# Patient Record
(demographics unavailable — no encounter records)

---

## 2025-07-30 NOTE — HISTORY OF PRESENT ILLNESS
[FreeTextEntry1] : Erick Abreu presented to the office today for follow-up evaluation.  He was last seen in the office July.  He is now 60 years old, with a history of essential hypertension and reflux. He does not have any known structural heart disease. He presented to the hospital having been referred from his internal medicine office because of essential hypertension which was poorly controlled. Over time his medications have been adjusted.  Echocardiography from August 2020 revealed an ejection fraction of 65%, with age-appropriate valvular calcification, minimal aortic and mitral regurgitation.  Estimated PA systolic pressure was borderline at 35 mmHg.   He has a lot of orthopedic issues, and he has been told he needs a right knee replacement. He was treated with meloxicam for knee pain and inflammation, and developed swelling of the legs.  He was evaluated for here in the office, was felt to have mostly venous insufficiency, for which he was advised to wear compression socks.   He was reevaluated because of edema and his blood pressure in July, 2024.  On furosemide, and with decreased fluid intake, there was improvement in the degree of edema.  On carvedilol, there was improvement in his blood pressure as well.  Previously,  He does not report symptoms of chest discomfort or shortness of breath with activity.  He denies orthopnea, PND.  He continues to experience a tendency toward significant venous congestion, despite the use of compression socks, and relatively frequent use of furosemide.  He denies palpitations, dizziness and syncope.  He reports that his blood pressure has been reasonably controlled, until recently. He went for medical clearance for a vein reflux procedure, and his BP was high, and there was concern that he might need to cancel it.  He had not been checking his blood pressure very frequently, and at this point is concerned that his blood pressure might be too elevated to get the procedure done.  He has been struggling from orthopedic issues, great knee pains and has now agreed to proceed with TKR, RT on 9/2/25 with Dr Kim at [facility]. He has been sedentary due to his knee issues. He is also very concerned about his persistent issues with LE edema/venous insufficiency. He was advised by vascular and was advised to have special compression boots. He has not followed. He does take lasix 40mg as needed daily for a week at a time.

## 2025-07-30 NOTE — CARDIOLOGY SUMMARY
[de-identified] : sinus rhythm  [de-identified] : 2019 exercise stress  10 METS [de-identified] : 5/2024 LVEF 61%, trace MR [de-identified] : 5/2024 LE doppler negative for DVT

## 2025-07-30 NOTE — CARDIOLOGY SUMMARY
[de-identified] : sinus rhythm  [de-identified] : 2019 exercise stress  10 METS [de-identified] : 5/2024 LVEF 61%, trace MR [de-identified] : 5/2024 LE doppler negative for DVT

## 2025-07-30 NOTE — CARDIOLOGY SUMMARY
[de-identified] : sinus rhythm  [de-identified] : 2019 exercise stress  10 METS [de-identified] : 5/2024 LVEF 61%, trace MR [de-identified] : 5/2024 LE doppler negative for DVT

## 2025-07-30 NOTE — HISTORY OF PRESENT ILLNESS
[FreeTextEntry1] : Erick Abreu presented to the office today for follow-up evaluation.  He was last seen in the office July.  He is now 60 years old, with a history of essential hypertension and reflux. He does not have any known structural heart disease. He presented to the hospital having been referred from his internal medicine office because of essential hypertension which was poorly controlled. Over time his medications have been adjusted.  Echocardiography from August 2020 revealed an ejection fraction of 65%, with age-appropriate valvular calcification, minimal aortic and mitral regurgitation.  Estimated PA systolic pressure was borderline at 35 mmHg.   He has a lot of orthopedic issues, and he has been told he needs a right knee replacement. He was treated with meloxicam for knee pain and inflammation, and developed swelling of the legs.  He was evaluated for here in the office, was felt to have mostly venous insufficiency, for which he was advised to wear compression socks.   He was reevaluated because of edema and his blood pressure in July, 2024.  On furosemide, and with decreased fluid intake, there was improvement in the degree of edema.  On carvedilol, there was improvement in his blood pressure as well.  Previously,  He does not report symptoms of chest discomfort or shortness of breath with activity.  He denies orthopnea, PND.  He continues to experience a tendency toward significant venous congestion, despite the use of compression socks, and relatively frequent use of furosemide.  He denies palpitations, dizziness and syncope.  He reports that his blood pressure has been reasonably controlled, until recently. He went for medical clearance for a vein reflux procedure, and his BP was high, and there was concern that he might need to cancel it.  He had not been checking his blood pressure very frequently, and at this point is concerned that his blood pressure might be too elevated to get the procedure done.  He has been struggling from orthopedic issues, great knee pains and has now agreed to proceed with TKR, RT on 9/2/25 with Dr Kim at [facility]. He has been sedentary due to his knee issues. He is also very concerned about his persistent issues with LE edema/venous insufficiency. He was advised by vascular and was advised to have special compression boots. He has not followed. He does take lasix 40mg as needed daily for a week at a time.

## 2025-07-30 NOTE — REASON FOR VISIT
[Hyperlipidemia] : hyperlipidemia [Hypertension] : hypertension [Follow-Up - Clinic] : a clinic follow-up of [Other: ____] : [unfilled]

## 2025-07-30 NOTE — PHYSICAL EXAM
[General Appearance - Well Developed] : well developed [Normal Appearance] : normal appearance [Well Groomed] : well groomed [General Appearance - Well Nourished] : well nourished [No Deformities] : no deformities [General Appearance - In No Acute Distress] : no acute distress [Eyelids - No Xanthelasma] : the eyelids demonstrated no xanthelasmas [Normal Oral Mucosa] : normal oral mucosa [No Oral Pallor] : no oral pallor [No Oral Cyanosis] : no oral cyanosis [Normal Jugular Venous A Waves Present] : normal jugular venous A waves present [Normal Jugular Venous V Waves Present] : normal jugular venous V waves present [No Jugular Venous Go A Waves] : no jugular venous og A waves [Respiration, Rhythm And Depth] : normal respiratory rhythm and effort [Exaggerated Use Of Accessory Muscles For Inspiration] : no accessory muscle use [Auscultation Breath Sounds / Voice Sounds] : lungs were clear to auscultation bilaterally [Abdomen Soft] : soft [Abdomen Tenderness] : non-tender [Abdomen Mass (___ Cm)] : no abdominal mass palpated [Abnormal Walk] : normal gait [Gait - Sufficient For Exercise Testing] : the gait was sufficient for exercise testing [Nail Clubbing] : no clubbing of the fingernails [Cyanosis, Localized] : no localized cyanosis [Petechial Hemorrhages (___cm)] : no petechial hemorrhages [Skin Color & Pigmentation] : normal skin color and pigmentation [] : no rash [No Venous Stasis] : no venous stasis [Skin Lesions] : no skin lesions [No Skin Ulcers] : no skin ulcer [No Xanthoma] : no  xanthoma was observed [Oriented To Time, Place, And Person] : oriented to person, place, and time [Affect] : the affect was normal [Mood] : the mood was normal [No Anxiety] : not feeling anxious [Normal Rate] : normal [2+] : left 2+ [Well Developed] : well developed [Normal Conjunctiva] : normal conjunctiva [Normal Venous Pressure] : normal venous pressure [Rhythm Regular] : regular [Normal S1] : normal S1 [Normal S2] : normal S2 [No Murmur] : no murmurs heard [___ +] : bilateral [unfilled]U+ pretibial pitting edema [Rt] : varicose veins of the right leg noted [Lt] : varicose veins of the left leg noted [No Abnormalities] : the abdominal aorta was not enlarged and no bruit was heard [Clear Lung Fields] : clear lung fields [Soft] : abdomen soft [Non Tender] : non-tender [Normal Gait] : normal gait [No Rash] : no rash [Moves all extremities] : moves all extremities [No Focal Deficits] : no focal deficits [Alert and Oriented] : alert and oriented [S3] : no S3 [S4] : no S4 [Right Femoral Bruit] : no bruit heard over the right femoral artery [Left Femoral Bruit] : no bruit heard over the left femoral artery [Right Carotid Bruit] : no bruit heard over the right carotid [Left Carotid Bruit] : no bruit heard over the left carotid

## 2025-07-30 NOTE — DISCUSSION/SUMMARY
[FreeTextEntry1] : Mr Abreu arrives for routine follow up and cardiac clearance. Prior visit history as noted.  He is euvolemic on exam.  ECG illustrates sinus rhythm. His blood pressure is elevated despite resting a while in the office. Though he reports controlled readings at home 120-130 systolic. He has been declining from a mobility perspective due to his ongoing knee issue and is now scheduled for TKR.  He will at this time undergo he will undergo a pharmacologic stress test to rule out ischemia from obstructive CAD. He will get a 2d echo to assess for any new structural heart disease, changes in valvular and ventricular function. Regarding the continued issue with venous insufficiency and LE edema.  He will continue furosemide 40mg as he is taking it (as needed), compression stockings daily as tolerated and elevation when able.  He will follow up with vascular regarding any further recommendations after he has recovered from his knee surgery. He will continue to keep an eye on his B/P and continue his current regimen for now,. amlodipine 10mg, benazepril 40mg , carvedilol 12.5mg BID and chlorthalidone 25mg.  I will be in touch with him regarding non invasive cardiac test results once completed.  At that time, he will be cleared for his TKR scheduled for September. He will follow up with me in 6 months. [EKG obtained to assist in diagnosis and management of assessed problem(s)] : EKG obtained to assist in diagnosis and management of assessed problem(s)

## 2025-07-30 NOTE — HISTORY OF PRESENT ILLNESS
[FreeTextEntry1] : Erick Abreu presented to the office today for follow-up evaluation.  He was last seen in the office July.  He is now 60 years old, with a history of essential hypertension and reflux. He does not have any known structural heart disease. He presented to the hospital having been referred from his internal medicine office because of essential hypertension which was poorly controlled. Over time his medications have been adjusted.  Echocardiography from August 2020 revealed an ejection fraction of 65%, with age-appropriate valvular calcification, minimal aortic and mitral regurgitation.  Estimated PA systolic pressure was borderline at 35 mmHg.   He has a lot of orthopedic issues, and he has been told he needs a right knee replacement. He was treated with meloxicam for knee pain and inflammation, and developed swelling of the legs.  He was evaluated for here in the office, was felt to have mostly venous insufficiency, for which he was advised to wear compression socks.   He was reevaluated because of edema and his blood pressure in July, 2024.  On furosemide, and with decreased fluid intake, there was improvement in the degree of edema.  On carvedilol, there was improvement in his blood pressure as well.  Previously,  He does not report symptoms of chest discomfort or shortness of breath with activity.  He denies orthopnea, PND.  He continues to experience a tendency toward significant venous congestion, despite the use of compression socks, and relatively frequent use of furosemide.  He denies palpitations, dizziness and syncope.  He reports that his blood pressure has been reasonably controlled, until recently. He went for medical clearance for a vein reflux procedure, and his BP was high, and there was concern that he might need to cancel it.  He had not been checking his blood pressure very frequently, and at this point is concerned that his blood pressure might be too elevated to get the procedure done.  He has been struggling from orthopedic issues, great knee pains and has now agreed to proceed with TKR, RT on 9/2/25 with Dr Kim at [facility]. He has been sedentary due to his knee issues. He is also very concerned about his persistent issues with LE edema/venous insufficiency. He was advised by vascular and was advised to have special compression boots. He has not followed. He does take lasix 40mg as needed daily for a week at a time.

## 2025-07-30 NOTE — ADDENDUM
[FreeTextEntry1] : htn and venous insufficiency on 4 meds for bp sched for tkr on 9/2 bp at home 130-140 preop ischemic eval with pharm and tte